# Patient Record
Sex: MALE | Race: OTHER | URBAN - METROPOLITAN AREA
[De-identification: names, ages, dates, MRNs, and addresses within clinical notes are randomized per-mention and may not be internally consistent; named-entity substitution may affect disease eponyms.]

---

## 2021-07-25 ENCOUNTER — INPATIENT (INPATIENT)
Facility: HOSPITAL | Age: 26
LOS: 0 days | Discharge: HOME | End: 2021-07-26
Attending: INTERNAL MEDICINE | Admitting: INTERNAL MEDICINE
Payer: COMMERCIAL

## 2021-07-25 VITALS
OXYGEN SATURATION: 97 % | HEART RATE: 101 BPM | TEMPERATURE: 98 F | RESPIRATION RATE: 19 BRPM | SYSTOLIC BLOOD PRESSURE: 144 MMHG | DIASTOLIC BLOOD PRESSURE: 82 MMHG

## 2021-07-25 LAB
ALBUMIN SERPL ELPH-MCNC: 4.8 G/DL — SIGNIFICANT CHANGE UP (ref 3.5–5.2)
ALP SERPL-CCNC: 89 U/L — SIGNIFICANT CHANGE UP (ref 30–115)
ALT FLD-CCNC: 31 U/L — SIGNIFICANT CHANGE UP (ref 0–41)
ANION GAP SERPL CALC-SCNC: 18 MMOL/L — HIGH (ref 7–14)
AST SERPL-CCNC: 27 U/L — SIGNIFICANT CHANGE UP (ref 0–41)
BASOPHILS # BLD AUTO: 0.06 K/UL — SIGNIFICANT CHANGE UP (ref 0–0.2)
BASOPHILS NFR BLD AUTO: 0.7 % — SIGNIFICANT CHANGE UP (ref 0–1)
BILIRUB SERPL-MCNC: 0.9 MG/DL — SIGNIFICANT CHANGE UP (ref 0.2–1.2)
BUN SERPL-MCNC: 12 MG/DL — SIGNIFICANT CHANGE UP (ref 10–20)
CALCIUM SERPL-MCNC: 9.4 MG/DL — SIGNIFICANT CHANGE UP (ref 8.5–10.1)
CHLORIDE SERPL-SCNC: 100 MMOL/L — SIGNIFICANT CHANGE UP (ref 98–110)
CO2 SERPL-SCNC: 24 MMOL/L — SIGNIFICANT CHANGE UP (ref 17–32)
CREAT SERPL-MCNC: 1 MG/DL — SIGNIFICANT CHANGE UP (ref 0.7–1.5)
EOSINOPHIL # BLD AUTO: 0.19 K/UL — SIGNIFICANT CHANGE UP (ref 0–0.7)
EOSINOPHIL NFR BLD AUTO: 2.2 % — SIGNIFICANT CHANGE UP (ref 0–8)
GLUCOSE SERPL-MCNC: 98 MG/DL — SIGNIFICANT CHANGE UP (ref 70–99)
HCT VFR BLD CALC: 46.3 % — SIGNIFICANT CHANGE UP (ref 42–52)
HGB BLD-MCNC: 16.1 G/DL — SIGNIFICANT CHANGE UP (ref 14–18)
IMM GRANULOCYTES NFR BLD AUTO: 0.4 % — HIGH (ref 0.1–0.3)
LYMPHOCYTES # BLD AUTO: 2.06 K/UL — SIGNIFICANT CHANGE UP (ref 1.2–3.4)
LYMPHOCYTES # BLD AUTO: 24.1 % — SIGNIFICANT CHANGE UP (ref 20.5–51.1)
MCHC RBC-ENTMCNC: 30.4 PG — SIGNIFICANT CHANGE UP (ref 27–31)
MCHC RBC-ENTMCNC: 34.8 G/DL — SIGNIFICANT CHANGE UP (ref 32–37)
MCV RBC AUTO: 87.4 FL — SIGNIFICANT CHANGE UP (ref 80–94)
MONOCYTES # BLD AUTO: 0.63 K/UL — HIGH (ref 0.1–0.6)
MONOCYTES NFR BLD AUTO: 7.4 % — SIGNIFICANT CHANGE UP (ref 1.7–9.3)
NEUTROPHILS # BLD AUTO: 5.58 K/UL — SIGNIFICANT CHANGE UP (ref 1.4–6.5)
NEUTROPHILS NFR BLD AUTO: 65.2 % — SIGNIFICANT CHANGE UP (ref 42.2–75.2)
NRBC # BLD: 0 /100 WBCS — SIGNIFICANT CHANGE UP (ref 0–0)
PLATELET # BLD AUTO: 277 K/UL — SIGNIFICANT CHANGE UP (ref 130–400)
POTASSIUM SERPL-MCNC: 4.6 MMOL/L — SIGNIFICANT CHANGE UP (ref 3.5–5)
POTASSIUM SERPL-SCNC: 4.6 MMOL/L — SIGNIFICANT CHANGE UP (ref 3.5–5)
PROT SERPL-MCNC: 7.2 G/DL — SIGNIFICANT CHANGE UP (ref 6–8)
RBC # BLD: 5.3 M/UL — SIGNIFICANT CHANGE UP (ref 4.7–6.1)
RBC # FLD: 12.8 % — SIGNIFICANT CHANGE UP (ref 11.5–14.5)
SODIUM SERPL-SCNC: 142 MMOL/L — SIGNIFICANT CHANGE UP (ref 135–146)
WBC # BLD: 8.55 K/UL — SIGNIFICANT CHANGE UP (ref 4.8–10.8)
WBC # FLD AUTO: 8.55 K/UL — SIGNIFICANT CHANGE UP (ref 4.8–10.8)

## 2021-07-25 PROCEDURE — 70491 CT SOFT TISSUE NECK W/DYE: CPT | Mod: 26,MA

## 2021-07-25 PROCEDURE — 71260 CT THORAX DX C+: CPT | Mod: 26,MA

## 2021-07-25 PROCEDURE — 99285 EMERGENCY DEPT VISIT HI MDM: CPT

## 2021-07-25 RX ORDER — GLUCAGON INJECTION, SOLUTION 0.5 MG/.1ML
1 INJECTION, SOLUTION SUBCUTANEOUS ONCE
Refills: 0 | Status: COMPLETED | OUTPATIENT
Start: 2021-07-25 | End: 2021-07-25

## 2021-07-25 RX ORDER — FAMOTIDINE 10 MG/ML
20 INJECTION INTRAVENOUS ONCE
Refills: 0 | Status: COMPLETED | OUTPATIENT
Start: 2021-07-25 | End: 2021-07-25

## 2021-07-25 RX ORDER — METOCLOPRAMIDE HCL 10 MG
10 TABLET ORAL ONCE
Refills: 0 | Status: COMPLETED | OUTPATIENT
Start: 2021-07-25 | End: 2021-07-25

## 2021-07-25 RX ADMIN — Medication 10 MILLIGRAM(S): at 21:28

## 2021-07-25 RX ADMIN — GLUCAGON INJECTION, SOLUTION 1 MILLIGRAM(S): 0.5 INJECTION, SOLUTION SUBCUTANEOUS at 21:28

## 2021-07-25 RX ADMIN — GLUCAGON INJECTION, SOLUTION 1 MILLIGRAM(S): 0.5 INJECTION, SOLUTION SUBCUTANEOUS at 22:42

## 2021-07-25 RX ADMIN — Medication 104 MILLIGRAM(S): at 21:28

## 2021-07-25 RX ADMIN — FAMOTIDINE 20 MILLIGRAM(S): 10 INJECTION INTRAVENOUS at 21:27

## 2021-07-25 NOTE — ED ADULT TRIAGE NOTE - CHIEF COMPLAINT QUOTE
c/o choking on steak at approx 1945. Pt states the hemlic maneuver was performed but he feels like its still stuck. Pt able to speak in full sentences. o2 sat 97% RA.

## 2021-07-25 NOTE — CONSULT NOTE ADULT - ASSESSMENT
26 year old male with no significant medical problems presented to ED with food impaction. After eating stake around 8 PM he felt like something stuck in his chest. He endorsed that it happened before in the past after eating stake but when he throws up it usually resolves but never had any endoscopic intervention. This time he throws up and tried hemlich maneuver but didn't help and tried drinking soda but couldn't tolerate Currently he feels nauseated. He received 2 doses of glucagon, reglan, pepcid still unable to swallow his own saliva.   Denies any chest pain.   vitals stable except for HR of 110. Labs normal. CT neck soft tissue showed 1.7 cm foreign body at T2-T3 level with air distended esophagus.     #)Food impaction   s/p glucagon    plan for emergent endoscopy   covid swab pending

## 2021-07-25 NOTE — CONSULT NOTE ADULT - SUBJECTIVE AND OBJECTIVE BOX
Gastroenterology Consultation:    Patient is a 26y old  Male who presents with a chief complaint of     Admitted on: 07-25-21  HPI:  26 year old male with no significant medical problems presented to ED with food impaction. After eating stake around 8 PM he felt like something stuck in his chest. He endorsed that it happened before in the past after eating stake but when he throws up it usually resolves but never had any endoscopic intervention. This time he throws up and tried hemlich maneuver but didn't help and tried drinking soda but couldn't tolerate Currently he feels nauseated. He received 2 doses of glucagon, reglan, pepcid still unable to swallow his own saliva.   Denies any chest pain.   vitals stable except for HR of 110. Labs normal. CT neck soft tissue showed 1.7 cm foreign body at T2-T3 level with air distended esophagus.     Prior EGD: never had  Prior Colonoscopy: never had      PAST MEDICAL & SURGICAL HISTORY:      FAMILY HISTORY:  No pertinent GI cancers    Social History:  Tobacco: No   Alcohol: social  Drugs: no    Home Medications:    MEDICATIONS  (STANDING):    MEDICATIONS  (PRN):      Allergies  No Known Allergies      Review of Systems:   Constitutional:  No Fever, No Chills  ENT/Mouth:  No Hearing Changes,  No Difficulty Swallowing  Eyes:  No Eye Pain, No Vision Changes  Cardiovascular:  No Chest Pain, No Palpitations  Respiratory:  No Cough, No Dyspnea  Gastrointestinal:  As described in HPI  Musculoskeletal:  No Joint Swelling, No Back Pain  Skin:  No Skin Lesions, No Jaundice  Neuro:  No Syncope, No Dizziness  Heme/Lymph:  No Bruising, No Bleeding.          Physical Examination:  T(C): 36.6 (07-25-21 @ 20:14), Max: 36.6 (07-25-21 @ 20:14)  HR: 101 (07-25-21 @ 20:14) (101 - 101)  BP: 144/82 (07-25-21 @ 20:14) (144/82 - 144/82)  RR: 19 (07-25-21 @ 20:14) (19 - 19)  SpO2: 97% (07-25-21 @ 20:14) (97% - 97%)        Constitutional: No acute distress.  Eyes:. Conjunctivae are clear, Sclera is non-icteric.  Ears Nose and Throat: The external ears are normal appearing,  Oral mucosa is pink and moist.  Respiratory:  No signs of respiratory distress. Lung sounds are clear bilaterally.  Cardiovascular:  S1 S2, Regular rate and rhythm.  GI: Abdomen is soft, symmetric, and non-tender without distention. There are no visible lesions or scars. Bowel sounds are present and normoactive in all four quadrants. No masses, hepatomegaly, or splenomegaly are noted.   Neuro: No Tremor, No involuntary movements  Skin: No rashes, No Jaundice.          Data:                        16.1   8.55  )-----------( 277      ( 25 Jul 2021 22:00 )             46.3     Hgb Trend:  16.1  07-25-21 @ 22:00      07-25    142  |  100  |  12  ----------------------------<  98  4.6   |  24  |  1.0    Ca    9.4      25 Jul 2021 22:00    TPro  7.2  /  Alb  4.8  /  TBili  0.9  /  DBili  x   /  AST  27  /  ALT  31  /  AlkPhos  89  07-25    Liver panel trend:  TBili 0.9   /   AST 27   /   ALT 31   /   AlkP 89   /   Tptn 7.2   /   Alb 4.8    /   DBili --      07-25              Radiology:      < from: CT Neck Soft Tissue w/ IV Cont (07.25.21 @ 22:02) >  IMPRESSION:      Within the esophagus at the level of T2-T3 there is a heterogeneous substance with proximally air distended esophagus, likely obstructed food bolus. Distally, the esophageal wall appears thickened to the GE junction. No mediastinal free air.    < end of copied text >

## 2021-07-25 NOTE — ED PROVIDER NOTE - PHYSICAL EXAMINATION
CONSTITUTIONAL: Well-developed; well-nourished; in no acute distress.   SKIN: warm, dry  HEAD: Normocephalic; atraumatic.  EYES: no conjunctival injection. EOMI.   ENT: No nasal discharge; airway clear. No foreign body visualized in throat.   NECK: Supple; non tender.  CARD: S1, S2 normal; Regular rate and rhythm.   RESP: No wheezes, rales or rhonchi.  ABD: soft ntnd.   EXT: Normal ROM.  No clubbing, cyanosis or edema.   LYMPH: No acute cervical adenopathy.  NEURO: Alert, oriented, grossly unremarkable. No FND.   PSYCH: Cooperative, appropriate.

## 2021-07-25 NOTE — ED PROVIDER NOTE - NS ED ROS FT
Review of Systems:  CONSTITUTIONAL: No fever, No diaphoresis   SKIN: No rash  HEMATOLOGIC: No abnormal bleeding or bruising  EYES: No eye pain, No blurred vision  ENT: No change in hearing, +throat pain, No neck pain, No rhinorrhea   RESPIRATORY: No shortness of breath, No cough  CARDIAC: No chest pain, No palpitations  GI: No abdominal pain, No nausea, No vomiting, No diarrhea, No constipation, No bright red blood per rectum or melena. No flank pain  : No dysuria, frequency, hematuria.   MUSCULOSKELETAL: No joint paint, No swelling, No back pain  NEUROLOGIC: No numbness, No focal weakness, No headache, No dizziness  All other systems negative, unless specified in HPI

## 2021-07-25 NOTE — ED PROVIDER NOTE - OBJECTIVE STATEMENT
27 y/o M no reported PMHx presents to ED with foreign body in throat. Pt reports he was eating steak dinner around 8pm tonight, began choking and some steak came up but some he still feels is stuck. Pt having trouble swallowing but tolerating secretions. No vomiting, No fevers. Pt reports he had something similar happen in past but has never seen GI.

## 2021-07-25 NOTE — ED PROVIDER NOTE - CLINICAL SUMMARY MEDICAL DECISION MAKING FREE TEXT BOX
Pt with hx of esophageal FB impaction, presents for FB in esophagus after eating steak. Attempted soda, glucagon. Labs, imaging obtained. Pt still with FB in esophagus. GI consulted, will take pt to endoscopy. Endorsed to MAR.

## 2021-07-25 NOTE — ED PROVIDER NOTE - PROGRESS NOTE DETAILS
DL - Spoke with GI Dr. Rhodes, will come evaluate patient. DL - Per GI, pt to go for endoscopy now. COVID swab sent. Will adm to medicine.

## 2021-07-25 NOTE — ED PROVIDER NOTE - ATTENDING CONTRIBUTION TO CARE
I personally evaluated the patient. I reviewed the Resident’s or Physician Assistant’s note (as assigned above), and agree with the findings and plan except as documented in my note.    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 Mild, constant. + unable to swallow fluids but is handling secretions. No SOB.     Constitutional: Well developed, well nourished. NAD.  Head: Normocephalic, atraumatic.  Eyes: PERRL. EOMI.  ENT: No nasal discharge. Mucous membranes moist.  Neck: Supple. Painless ROM.  Cardiovascular: Normal S1, S2. Regular rate and rhythm. No murmurs, rubs, or gallops.  Pulmonary: Normal respiratory rate and effort. Lungs clear to auscultation bilaterally. No wheezing, rales, or rhonchi.  Abdominal: Soft. Nondistended. Nontender. No rebound, guarding, rigidity.  Extremities. Pelvis stable. No lower extremity edema, symmetric calves.  Skin: No rashes, cyanosis.  Neuro: AAOx3. No focal neurological deficits.  Psych: Normal mood. Normal affect.

## 2021-07-25 NOTE — ED ADULT NURSE NOTE - OBJECTIVE STATEMENT
Pt presented to ED c/o foreign body within the throat. Pt c/o throat pain after choking on steak. Pt family had Heimlich Maneuver done. Airway intact. Pt c/o n/v and inability to swallow fully. Pt A&Ox4, ambulatory. Pt able to speak in full sentences. Pt denies d/fevers/chills. Pt denies chest pain.

## 2021-07-26 VITALS
SYSTOLIC BLOOD PRESSURE: 124 MMHG | HEART RATE: 58 BPM | RESPIRATION RATE: 20 BRPM | TEMPERATURE: 97 F | DIASTOLIC BLOOD PRESSURE: 70 MMHG

## 2021-07-26 LAB
ALBUMIN SERPL ELPH-MCNC: 4.1 G/DL — SIGNIFICANT CHANGE UP (ref 3.5–5.2)
ALP SERPL-CCNC: 77 U/L — SIGNIFICANT CHANGE UP (ref 30–115)
ALT FLD-CCNC: 25 U/L — SIGNIFICANT CHANGE UP (ref 0–41)
ANION GAP SERPL CALC-SCNC: 11 MMOL/L — SIGNIFICANT CHANGE UP (ref 7–14)
AST SERPL-CCNC: 22 U/L — SIGNIFICANT CHANGE UP (ref 0–41)
BASOPHILS # BLD AUTO: 0.05 K/UL — SIGNIFICANT CHANGE UP (ref 0–0.2)
BASOPHILS NFR BLD AUTO: 0.6 % — SIGNIFICANT CHANGE UP (ref 0–1)
BILIRUB SERPL-MCNC: 1.2 MG/DL — SIGNIFICANT CHANGE UP (ref 0.2–1.2)
BUN SERPL-MCNC: 11 MG/DL — SIGNIFICANT CHANGE UP (ref 10–20)
CALCIUM SERPL-MCNC: 9 MG/DL — SIGNIFICANT CHANGE UP (ref 8.5–10.1)
CHLORIDE SERPL-SCNC: 103 MMOL/L — SIGNIFICANT CHANGE UP (ref 98–110)
CO2 SERPL-SCNC: 26 MMOL/L — SIGNIFICANT CHANGE UP (ref 17–32)
CREAT SERPL-MCNC: 0.8 MG/DL — SIGNIFICANT CHANGE UP (ref 0.7–1.5)
EOSINOPHIL # BLD AUTO: 0.23 K/UL — SIGNIFICANT CHANGE UP (ref 0–0.7)
EOSINOPHIL NFR BLD AUTO: 2.9 % — SIGNIFICANT CHANGE UP (ref 0–8)
GLUCOSE SERPL-MCNC: 93 MG/DL — SIGNIFICANT CHANGE UP (ref 70–99)
HCT VFR BLD CALC: 42 % — SIGNIFICANT CHANGE UP (ref 42–52)
HGB BLD-MCNC: 14 G/DL — SIGNIFICANT CHANGE UP (ref 14–18)
IMM GRANULOCYTES NFR BLD AUTO: 0.3 % — SIGNIFICANT CHANGE UP (ref 0.1–0.3)
LYMPHOCYTES # BLD AUTO: 2.51 K/UL — SIGNIFICANT CHANGE UP (ref 1.2–3.4)
LYMPHOCYTES # BLD AUTO: 31.7 % — SIGNIFICANT CHANGE UP (ref 20.5–51.1)
MAGNESIUM SERPL-MCNC: 1.9 MG/DL — SIGNIFICANT CHANGE UP (ref 1.8–2.4)
MCHC RBC-ENTMCNC: 29 PG — SIGNIFICANT CHANGE UP (ref 27–31)
MCHC RBC-ENTMCNC: 33.3 G/DL — SIGNIFICANT CHANGE UP (ref 32–37)
MCV RBC AUTO: 87.1 FL — SIGNIFICANT CHANGE UP (ref 80–94)
MONOCYTES # BLD AUTO: 0.83 K/UL — HIGH (ref 0.1–0.6)
MONOCYTES NFR BLD AUTO: 10.5 % — HIGH (ref 1.7–9.3)
NEUTROPHILS # BLD AUTO: 4.29 K/UL — SIGNIFICANT CHANGE UP (ref 1.4–6.5)
NEUTROPHILS NFR BLD AUTO: 54 % — SIGNIFICANT CHANGE UP (ref 42.2–75.2)
NRBC # BLD: 0 /100 WBCS — SIGNIFICANT CHANGE UP (ref 0–0)
PHOSPHATE SERPL-MCNC: 4.3 MG/DL — SIGNIFICANT CHANGE UP (ref 2.1–4.9)
PLATELET # BLD AUTO: 245 K/UL — SIGNIFICANT CHANGE UP (ref 130–400)
POTASSIUM SERPL-MCNC: 4.3 MMOL/L — SIGNIFICANT CHANGE UP (ref 3.5–5)
POTASSIUM SERPL-SCNC: 4.3 MMOL/L — SIGNIFICANT CHANGE UP (ref 3.5–5)
PROT SERPL-MCNC: 6.2 G/DL — SIGNIFICANT CHANGE UP (ref 6–8)
RBC # BLD: 4.82 M/UL — SIGNIFICANT CHANGE UP (ref 4.7–6.1)
RBC # FLD: 12.8 % — SIGNIFICANT CHANGE UP (ref 11.5–14.5)
SARS-COV-2 RNA SPEC QL NAA+PROBE: SIGNIFICANT CHANGE UP
SODIUM SERPL-SCNC: 140 MMOL/L — SIGNIFICANT CHANGE UP (ref 135–146)
WBC # BLD: 7.93 K/UL — SIGNIFICANT CHANGE UP (ref 4.8–10.8)
WBC # FLD AUTO: 7.93 K/UL — SIGNIFICANT CHANGE UP (ref 4.8–10.8)

## 2021-07-26 PROCEDURE — 99221 1ST HOSP IP/OBS SF/LOW 40: CPT | Mod: GC

## 2021-07-26 RX ORDER — SUCRALFATE 1 G
1 TABLET ORAL EVERY 6 HOURS
Refills: 0 | Status: DISCONTINUED | OUTPATIENT
Start: 2021-07-26 | End: 2021-07-26

## 2021-07-26 RX ORDER — ONDANSETRON 8 MG/1
4 TABLET, FILM COATED ORAL EVERY 8 HOURS
Refills: 0 | Status: DISCONTINUED | OUTPATIENT
Start: 2021-07-26 | End: 2021-07-26

## 2021-07-26 RX ORDER — PANTOPRAZOLE SODIUM 20 MG/1
1 TABLET, DELAYED RELEASE ORAL
Qty: 0 | Refills: 0 | DISCHARGE
Start: 2021-07-26

## 2021-07-26 RX ORDER — SUCRALFATE 1 G
1 TABLET ORAL
Qty: 120 | Refills: 0
Start: 2021-07-26 | End: 2021-08-24

## 2021-07-26 RX ORDER — PANTOPRAZOLE SODIUM 20 MG/1
1 TABLET, DELAYED RELEASE ORAL
Qty: 60 | Refills: 0
Start: 2021-07-26 | End: 2021-08-24

## 2021-07-26 RX ORDER — PANTOPRAZOLE SODIUM 20 MG/1
40 TABLET, DELAYED RELEASE ORAL
Refills: 0 | Status: DISCONTINUED | OUTPATIENT
Start: 2021-07-26 | End: 2021-07-26

## 2021-07-26 RX ORDER — SODIUM CHLORIDE 9 MG/ML
1000 INJECTION, SOLUTION INTRAVENOUS
Refills: 0 | Status: DISCONTINUED | OUTPATIENT
Start: 2021-07-26 | End: 2021-07-26

## 2021-07-26 RX ORDER — SUCRALFATE 1 G
1 TABLET ORAL
Qty: 0 | Refills: 0 | DISCHARGE
Start: 2021-07-26

## 2021-07-26 RX ADMIN — PANTOPRAZOLE SODIUM 40 MILLIGRAM(S): 20 TABLET, DELAYED RELEASE ORAL at 05:54

## 2021-07-26 RX ADMIN — Medication 1 GRAM(S): at 11:14

## 2021-07-26 RX ADMIN — Medication 1 GRAM(S): at 05:54

## 2021-07-26 NOTE — DISCHARGE NOTE NURSING/CASE MANAGEMENT/SOCIAL WORK - PATIENT PORTAL LINK FT
You can access the FollowMyHealth Patient Portal offered by [facility] by registering at the following website: [email or web address]. By joining [employer]’s FollowMyHealth portal, you will also be able to view your health information using other applications (apps) compatible with our system.

## 2021-07-26 NOTE — DISCHARGE NOTE PROVIDER - NSDCCPCAREPLAN_GEN_ALL_CORE_FT
PRINCIPAL DISCHARGE DIAGNOSIS  Diagnosis: Foreign body in esophagus  Assessment and Plan of Treatment: You were admitted because a piece of steak became dislodged in your esophagus. An emergent endoscopy was done and the piece of steak was pushed down to your esophagus.  You will be discharged with carafate 1g four times a day and pantoprazole 40mg by mouth twice a day.  When you are eating food, especially red meat, it is important you slowly and carefully chew your food to prevent choking. Please follow up with your gastroenterologist for a repeat endoscopy in the near future. If you have another episode of choking and not able to clear whatever food is obstructing, then immediately return to the ED.

## 2021-07-26 NOTE — H&P ADULT - NSHPLABSRESULTS_GEN_ALL_CORE
CBC Full  -  ( 25 Jul 2021 22:00 )  WBC Count : 8.55 K/uL  RBC Count : 5.30 M/uL  Hemoglobin : 16.1 g/dL  Hematocrit : 46.3 %  Platelet Count - Automated : 277 K/uL  Mean Cell Volume : 87.4 fL  Mean Cell Hemoglobin : 30.4 pg  Mean Cell Hemoglobin Concentration : 34.8 g/dL  Auto Neutrophil # : 5.58 K/uL  Auto Lymphocyte # : 2.06 K/uL  Auto Monocyte # : 0.63 K/uL  Auto Eosinophil # : 0.19 K/uL  Auto Basophil # : 0.06 K/uL  Auto Neutrophil % : 65.2 %  Auto Lymphocyte % : 24.1 %  Auto Monocyte % : 7.4 %  Auto Eosinophil % : 2.2 %  Auto Basophil % : 0.7 %    07-25    142  |  100  |  12  ----------------------------<  98  4.6   |  24  |  1.0    Ca    9.4      25 Jul 2021 22:00    TPro  7.2  /  Alb  4.8  /  TBili  0.9  /  DBili  x   /  AST  27  /  ALT  31  /  AlkPhos  89  07-25  < from: CT Chest w/ IV Cont (07.25.21 @ 22:02) >    IMPRESSION:    Again noted heterogeneous substance within the esophagus at the level of T2-T3, with distal esophageal wall thickening. Findingssuggestive of impacted food bolus. No mediastinal free air.    No focal consolidation, effusion, or pneumothorax.    --- End of Report ---    < end of copied text > CBC Full  -  ( 25 Jul 2021 22:00 )  WBC Count : 8.55 K/uL  RBC Count : 5.30 M/uL  Hemoglobin : 16.1 g/dL  Hematocrit : 46.3 %  Platelet Count - Automated : 277 K/uL  Mean Cell Volume : 87.4 fL  Mean Cell Hemoglobin : 30.4 pg  Mean Cell Hemoglobin Concentration : 34.8 g/dL  Auto Neutrophil # : 5.58 K/uL  Auto Lymphocyte # : 2.06 K/uL  Auto Monocyte # : 0.63 K/uL  Auto Eosinophil # : 0.19 K/uL  Auto Basophil # : 0.06 K/uL  Auto Neutrophil % : 65.2 %  Auto Lymphocyte % : 24.1 %  Auto Monocyte % : 7.4 %  Auto Eosinophil % : 2.2 %  Auto Basophil % : 0.7 %    07-25    142  |  100  |  12  ----------------------------<  98  4.6   |  24  |  1.0    Ca    9.4      25 Jul 2021 22:00    TPro  7.2  /  Alb  4.8  /  TBili  0.9  /  DBili  x   /  AST  27  /  ALT  31  /  AlkPhos  89  07-25  < from: CT Chest w/ IV Cont (07.25.21 @ 22:02) >    IMPRESSION:    Again noted heterogeneous substance within the esophagus at the level of T2-T3, with distal esophageal wall thickening. Findingssuggestive of impacted food bolus. No mediastinal free air.    No focal consolidation, effusion, or pneumothorax.    --- End of Report ---    < end of copied text >      Within the esophagus at the level of T2-T3 there is a heterogeneous substance with proximally air distended esophagus, likely obstructed food bolus.

## 2021-07-26 NOTE — CHART NOTE - NSCHARTNOTEFT_GEN_A_CORE
<<<RESIDENT DISCHARGE NOTE>>>     LANI ZAVALETA  MRN-[id number]    VITAL SIGNS:  T(F): 97.7 (07-26-21 @ 02:15), Max: 98.1 (07-26-21 @ 00:55)  HR: 62 (07-26-21 @ 02:15)  BP: 127/71 (07-26-21 @ 02:15)  SpO2: 99% (07-26-21 @ 02:15)  Weight (kg): 97.5 (07-26-21 @ 00:58)  BMI (kg/m2): 30 (07-26-21 @ 00:58)    PHYSICAL EXAMINATION:  General: NAD, well- developed  Head & Neck: normocephalic, atraumatic  Pulmonary: CTAL bilaterally  Cardiovascular: regular rate and rhythm, S1 and S2 present  Gastrointestinal/Abdomen & Pelvis: soft, nontender, nondistended, +BS  Neurologic/Motor: nonfocal, A&Ox3    TEST RESULTS:                        14.0   7.93  )-----------( 245      ( 26 Jul 2021 05:53 )             42.0       07-26    140  |  103  |  11  ----------------------------<  93  4.3   |  26  |  0.8    Ca    9.0      26 Jul 2021 05:53  Phos  4.3     07-26  Mg     1.9     07-26    TPro  6.2  /  Alb  4.1  /  TBili  1.2  /  DBili  x   /  AST  22  /  ALT  25  /  AlkPhos  77  07-26      FINAL DISCHARGE INTERVIEW:  Resident(s) Present: (Name: John Whatley PGY-1 ), RN Present: (Name:  ___________)    DISCHARGE MEDICATION RECONCILIATION  reviewed with Attending (Name: Dr. Rosario )    DISPOSITION:   [ X ] Home,    [  ] Home with Visiting Nursing Services,   [    ]  SNF/ NH,    [   ] Acute Rehab (4A),   [   ] Other (Specify:_________)

## 2021-07-26 NOTE — H&P ADULT - ATTENDING COMMENTS
HPI:  This is a 26 year old M with a non-contributory PMHx presents with a one day history of shortness of breath, cough after choking on a steak this evening.  Patient was in his usual state of health when he began to ate steak.  He suddenly choked on a small piece of steak, which caused him to have excessive cough and  had one episode of NBNB vomiting in an effort to expectorate it.  He also tried to drink   Patient's loved one performed the Heimlich maneuver which only removed part of the piece. Patient endorsed that he felt that the food "was still stuck" and the sensation was localized substernally. Patient also has a constant need to spit as a result of this persistent sensation. The patient has had episodes of feeling this sensation when eating red meat, but denies previous choking episodes.   Patient denies chest pain, drooling, shortness of breath, abdominal pain, fever, rigors.  Of note, prior to presentation, the patient endorses having a recurrence of vomiting episodes over the past year with concurrent nausea.  It occurs intermittently and is relieved with hot showers.     ICU Vital Signs Last 24 Hrs  T(C): 36.6 (25 Jul 2021 20:14), Max: 36.6 (25 Jul 2021 20:14)  T(F): 97.8 (25 Jul 2021 20:14), Max: 97.8 (25 Jul 2021 20:14)  HR: 101 (25 Jul 2021 20:14) (101 - 101)  BP: 144/82 (25 Jul 2021 20:14) (144/82 - 144/82)  BP(mean): --  ABP: --  ABP(mean): --  RR: 19 (25 Jul 2021 20:14) (19 - 19)  SpO2: 97% (25 Jul 2021 20:14) (97% - 97%)    In the ED: Patient was given 2x doses of glucagon,1x dose of reglan and 1x dose of famotidine.  CT Neck w/IV contrast demonstrated Within the esophagus at the level of T2-T3 there is a heterogeneous substance with proximally air distended esophagus, likely obstructed food bolus. Distally, the esophageal wall appears thickened to the GE junction. No mediastinal free air. CT Chest unremarkable for other thoracic abnormalities.  (26 Jul 2021 00:20)    REVIEW OF SYSTEMS:  CONSTITUTIONAL: No weakness, fevers or chills  EYES/ENT: No visual changes;  No vertigo or throat pain , (+) diff swallowing  NECK: No pain or stiffness  RESPIRATORY: No cough, wheezing, hemoptysis; No shortness of breath  CARDIOVASCULAR: No chest pain or palpitations  GASTROINTESTINAL: No abdominal or epigastric pain. No nausea, vomiting, or hematemesis; No diarrhea or constipation. No melena or hematochezia.  GENITOURINARY: No dysuria, frequency or hematuria  NEUROLOGICAL: No numbness or weakness  SKIN: No itching, rashes    Physical Exam:  General: WN/WD NAD, Spitting up moderate amount of saliva  Neurology: A&Ox3, nonfocal, follows commands  Eyes: PERRLA/ EOMI  ENT/Neck: Neck supple, trachea midline, No JVD  Respiratory: CTA B/L, No wheezing, rales, rhonchi  CV: Normal rate regular rhythm, S1S2, no murmurs, rubs or gallops  Abdominal: Soft, NT, ND +BS,   Extremities: No edema, + peripheral pulses  Skin: No Rashes, Hematoma, Ecchymosis  Incisions:   Tubes:    A/p  Choking episode/Food impaction - steak r/o esophageal stricture/ring/web  -NPO and IV fluid  -Zofran PRN  -GI for emergent EGD   -F/U GI recommendations after EGD for possible early discharge    DVT prophylaxis -ambulate

## 2021-07-26 NOTE — DISCHARGE NOTE PROVIDER - CARE PROVIDER_API CALL
Rigo Mahan  Gastroenterology  [address] [place] [id number]  Phone: [phone]  Fax: [phone]  Follow Up Time: 1 week

## 2021-07-26 NOTE — CHART NOTE - NSCHARTNOTEFT_GEN_A_CORE
Endoscopy procedure:   EGD was done under deep sedation without intubation.   1.5-2 cm food bolus(likely stake)was found in the esophagus pushed into stomach.   Inflammation as noted in the esophagus, no biopsy was taken     recs:   PPI BID  carafate suspension 1gm 4 times daily  clear liquid diet for now can advance to soft diet if tolerates  can be discharged in AM.   Endoscopy as an OP to r/o EoE

## 2021-07-26 NOTE — H&P ADULT - ASSESSMENT
26 year old M with a non-contributory PMHx presents with a one day history of shortness of breath, cough after choking on a steak this evening.  Patient was in his usual state of health when he began to ate steak. 26 year old M with a non-contributory PMHx presents with a one day history of shortness of breath, cough after choking on a steak this evening.  Patient was in his usual state of health when he began to ate steak.    #Acute Dyspnea 2/2 Food Impaction   CBC, CMP unremarkable   s/p 2x doses of glucagon, 1x dose of famotidine and 1x dose of zofran  CT Neck w/ IV contrast demonstrates heterogeneous substance Within the esophagus at the level of T2-T3 there is a heterogeneous substance with proximally air distended esophagus, likely obstructed food bolus.  Patient is currently not in respiratory distress, but is struggling to swallow in his own saliva, but is not tripoding nor excessively drooling   COVID -ve  Zofran 4mg IV q8PRN for nausea  Patient pending emergent endoscopy     #Possible Cyclic Vomiting Syndrome  Patient endorses episodes of vomiting relieved by hot showers  I explained to the patient that this is likely due to his cannibis use  Cessation advised  Zofran as noted above    #DVT: Lovenox  #GI: none  #Activity: AAT  #Diet: NPO  Full Code  Acute

## 2021-07-26 NOTE — H&P ADULT - HISTORY OF PRESENT ILLNESS
This is a 26 year old M with a non-contributory PMHx presents with a one day history of shortness of breath, cough after choking on a steak this evening.  Patient was in his usual state of health when he began to ate steak.  He suddenly choked on a small piece of steak, which caused him to have excessive cough and  had one episode of NBNB vomiting in an effort to expectorate it.  Patient's loved one performed the Heimlich maneuver which only removed part of the piece. Patient endorsed that he felt that the food "was still stuck" and the sensation was localized substernally. Patient also has a constant need to spit as a result of this persistent sensation.  Patient denies chest pain, drooling, shortness of breath, abdominal pain, fever, rigors.  Of note, prior to presentation, the patient endorses having a recurrence of vomiting episodes over the past year with concurrent nausea.  It occurs intermittently and is relieved with hot showers.     ICU Vital Signs Last 24 Hrs  T(C): 36.6 (25 Jul 2021 20:14), Max: 36.6 (25 Jul 2021 20:14)  T(F): 97.8 (25 Jul 2021 20:14), Max: 97.8 (25 Jul 2021 20:14)  HR: 101 (25 Jul 2021 20:14) (101 - 101)  BP: 144/82 (25 Jul 2021 20:14) (144/82 - 144/82)  BP(mean): --  ABP: --  ABP(mean): --  RR: 19 (25 Jul 2021 20:14) (19 - 19)  SpO2: 97% (25 Jul 2021 20:14) (97% - 97%)    In the ED: Patient was given 2x doses of glucagon,1x dose of reglan and 1x dose of famotidine.  CT Neck w/IV contrast demonstrated Within the esophagus at the level of T2-T3 there is a heterogeneous substance with proximally air distended esophagus, likely obstructed food bolus. Distally, the esophageal wall appears thickened to the GE junction. No mediastinal free air. CT Chest unremarkable for other thoracic abnormalities.  This is a 26 year old M with a non-contributory PMHx presents with a one day history of shortness of breath, cough after choking on a steak this evening.  Patient was in his usual state of health when he began to ate steak.  He suddenly choked on a small piece of steak, which caused him to have excessive cough and  had one episode of NBNB vomiting in an effort to expectorate it.  He also tried to drink   Patient's loved one performed the Heimlich maneuver which only removed part of the piece. Patient endorsed that he felt that the food "was still stuck" and the sensation was localized substernally. Patient also has a constant need to spit as a result of this persistent sensation. The patient has had episodes of feeling this sensation when eating red meat, but denies previous choking episodes.   Patient denies chest pain, drooling, shortness of breath, abdominal pain, fever, rigors.  Of note, prior to presentation, the patient endorses having a recurrence of vomiting episodes over the past year with concurrent nausea.  It occurs intermittently and is relieved with hot showers.     ICU Vital Signs Last 24 Hrs  T(C): 36.6 (25 Jul 2021 20:14), Max: 36.6 (25 Jul 2021 20:14)  T(F): 97.8 (25 Jul 2021 20:14), Max: 97.8 (25 Jul 2021 20:14)  HR: 101 (25 Jul 2021 20:14) (101 - 101)  BP: 144/82 (25 Jul 2021 20:14) (144/82 - 144/82)  BP(mean): --  ABP: --  ABP(mean): --  RR: 19 (25 Jul 2021 20:14) (19 - 19)  SpO2: 97% (25 Jul 2021 20:14) (97% - 97%)    In the ED: Patient was given 2x doses of glucagon,1x dose of reglan and 1x dose of famotidine.  CT Neck w/IV contrast demonstrated Within the esophagus at the level of T2-T3 there is a heterogeneous substance with proximally air distended esophagus, likely obstructed food bolus. Distally, the esophageal wall appears thickened to the GE junction. No mediastinal free air. CT Chest unremarkable for other thoracic abnormalities.

## 2021-07-26 NOTE — DISCHARGE NOTE PROVIDER - HOSPITAL COURSE
This is a 26 year old M with a non-contributory PMHx presents with a one day history of shortness of breath, cough after choking on a steak this evening.  Patient was in his usual state of health when he began to ate steak.  He suddenly choked on a small piece of steak, which caused him to have excessive cough and  had one episode of NBNB vomiting in an effort to expectorate it.  He also tried to drink   Patient's loved one performed the Heimlich maneuver which only removed part of the piece. Patient endorsed that he felt that the food "was still stuck" and the sensation was localized substernally. Patient also has a constant need to spit as a result of this persistent sensation. The patient has had episodes of feeling this sensation when eating red meat, but denies previous choking episodes.   Patient denies chest pain, drooling, shortness of breath, abdominal pain, fever, rigors.  Of note, prior to presentation, the patient endorses having a recurrence of vomiting episodes over the past year with concurrent nausea.  It occurs intermittently and is relieved with hot showers.     In the ED: Patient was given 2x doses of glucagon,1x dose of reglan and 1x dose of famotidine.  CT Neck w/IV contrast demonstrated Within the esophagus at the level of T2-T3 there is a heterogeneous substance with proximally air distended esophagus, likely obstructed food bolus. Distally, the esophageal wall appears thickened to the GE junction. No mediastinal free air. CT Chest unremarkable for other thoracic abnormalities.     Endoscopy procedure:   EGD was done under deep sedation without intubation.   1.5-2 cm food bolus(likely stake)was found in the esophagus pushed into stomach.   Inflammation as noted in the esophagus, no biopsy was taken     recs:   PPI BID  carafate suspension 1gm 4 times daily  clear liquid diet for now can advance to soft diet if tolerates  can be discharged in AM.   Endoscopy as an OP to r/o EoE.

## 2021-07-26 NOTE — H&P ADULT - NSHPPHYSICALEXAM_GEN_ALL_CORE
PHYSICAL EXAM:  GENERAL: NAD, lying in bed comfortably  HEAD:  Atraumatic, Normocephalic  ENT: Moist mucous membranes  NECK: Supple, No JVD  CHEST/LUNG: Clear to auscultation bilaterally; No rales, rhonchi, wheezing, or rubs. Unlabored respirations  HEART: Regular rate and rhythm; No murmurs, rubs, or gallops  ABDOMEN: Bowel sounds present; Soft, Nontender, Nondistended. No hepatomegally  EXTREMITIES:  2+ Peripheral Pulses, brisk capillary refill. No clubbing, cyanosis, or edema  NERVOUS SYSTEM:  Alert & Oriented X3, speech clear. No deficits   MSK: FROM all 4 extremities, full and equal strength

## 2021-07-26 NOTE — DISCHARGE NOTE PROVIDER - NSDCMRMEDTOKEN_GEN_ALL_CORE_FT
pantoprazole 40 mg oral delayed release tablet: 1 tab(s) orally 2 times a day  sucralfate 1 g oral tablet: 1 tab(s) orally every 6 hours

## 2021-07-30 DIAGNOSIS — R11.15 CYCLICAL VOMITING SYNDROME UNRELATED TO MIGRAINE: ICD-10-CM

## 2021-07-30 DIAGNOSIS — T18.128A FOOD IN ESOPHAGUS CAUSING OTHER INJURY, INITIAL ENCOUNTER: ICD-10-CM
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
## 2023-03-15 NOTE — PATIENT PROFILE ADULT - PATIENT REPRESENTATIVE: ( YOU CAN CHOOSE ANY PERSON THAT CAN ASSIST YOU WITH YOUR HEALTH CARE PREFERENCES, DOES NOT HAVE TO BE A SPOUSE, IMMEDIATE FAMILY OR SIGNIFICANT OTHER/PARTNER)
Is This A New Presentation, Or A Follow-Up?: Skin Lesion
How Severe Is Your Skin Lesion?: mild
Has Your Skin Lesion Been Treated?: not been treated
What Type Of Note Output Would You Prefer (Optional)?: Standard Output
declines

## 2023-09-06 NOTE — PATIENT PROFILE ADULT - MEDICATIONS/VISITS
[No] : patient is not an appropriate candidate for kidney transplantation evaluation [Patient has started or completed the] : patient has started or completed the process of evaluation for renal transplant no

## 2025-02-04 NOTE — PATIENT PROFILE ADULT - DO YOU FEEL LIKE HURTING YOURSELF OR OTHERS?
[facility]  02/04/25                            Dear [place],    Your [employer] Education class is scheduled on     Tuesday March 4th, 2025   1:00pm  at [facility], located at [address] in [place].     Please enter at the Orthopedic Entrance on the south side of the facility.     Class is held in the orthopedic conference room: walk straight down the calix and classroom is on your right. There will be  a large sign posted. Please do not enter at the Main Entrance.    To follow our Safe Care Promise please follow these instructions:  You may bring ONE guest (must be over age 18.) We have limited space   Please arrive 20 minutes prior to class start time.   No children under the age of 18 will be allowed  Please do not come to class if you have any symptoms of illness    Please call if you have any questions.   I look forward to seeing you in class!    Lacy Florez BSN, RN  [facility] Coordinator   [phone]                                                                                                                                                                                                                                                                                                                                                                                                                                                                                                                                                                                                                                                                                      
no